# Patient Record
Sex: FEMALE | Race: WHITE | NOT HISPANIC OR LATINO
[De-identification: names, ages, dates, MRNs, and addresses within clinical notes are randomized per-mention and may not be internally consistent; named-entity substitution may affect disease eponyms.]

---

## 2017-11-26 ENCOUNTER — HEALTH MAINTENANCE LETTER (OUTPATIENT)
Age: 32
End: 2017-11-26

## 2020-03-02 ENCOUNTER — HEALTH MAINTENANCE LETTER (OUTPATIENT)
Age: 35
End: 2020-03-02

## 2020-12-14 ENCOUNTER — HEALTH MAINTENANCE LETTER (OUTPATIENT)
Age: 35
End: 2020-12-14

## 2021-04-18 ENCOUNTER — HEALTH MAINTENANCE LETTER (OUTPATIENT)
Age: 36
End: 2021-04-18

## 2021-10-02 ENCOUNTER — HEALTH MAINTENANCE LETTER (OUTPATIENT)
Age: 36
End: 2021-10-02

## 2022-05-14 ENCOUNTER — HEALTH MAINTENANCE LETTER (OUTPATIENT)
Age: 37
End: 2022-05-14

## 2022-09-04 ENCOUNTER — HEALTH MAINTENANCE LETTER (OUTPATIENT)
Age: 37
End: 2022-09-04

## 2023-02-03 ENCOUNTER — HOSPITAL ENCOUNTER (EMERGENCY)
Facility: CLINIC | Age: 38
Discharge: HOME | End: 2023-02-04
Attending: EMERGENCY MEDICINE | Admitting: EMERGENCY MEDICINE
Payer: COMMERCIAL

## 2023-02-03 VITALS
OXYGEN SATURATION: 97 % | SYSTOLIC BLOOD PRESSURE: 133 MMHG | BODY MASS INDEX: 29.35 KG/M2 | WEIGHT: 205 LBS | HEIGHT: 70 IN | HEART RATE: 91 BPM | DIASTOLIC BLOOD PRESSURE: 62 MMHG | RESPIRATION RATE: 16 BRPM | TEMPERATURE: 98 F

## 2023-02-03 DIAGNOSIS — R11.2 NAUSEA VOMITING AND DIARRHEA: ICD-10-CM

## 2023-02-03 DIAGNOSIS — R19.7 NAUSEA VOMITING AND DIARRHEA: ICD-10-CM

## 2023-02-03 LAB
ANION GAP SERPL CALCULATED.3IONS-SCNC: 13 MMOL/L (ref 7–15)
BUN SERPL-MCNC: 24.4 MG/DL (ref 6–20)
CALCIUM SERPL-MCNC: 9.7 MG/DL (ref 8.6–10)
CHLORIDE SERPL-SCNC: 107 MMOL/L (ref 98–107)
CREAT SERPL-MCNC: 0.87 MG/DL (ref 0.51–0.95)
DEPRECATED HCO3 PLAS-SCNC: 21 MMOL/L (ref 22–29)
GFR SERPL CREATININE-BSD FRML MDRD: 86 ML/MIN/1.73M2
GLUCOSE SERPL-MCNC: 133 MG/DL (ref 70–99)
HOLD SPECIMEN: NORMAL
HOLD SPECIMEN: NORMAL
POTASSIUM SERPL-SCNC: 4.2 MMOL/L (ref 3.4–5.3)
SODIUM SERPL-SCNC: 141 MMOL/L (ref 136–145)

## 2023-02-03 PROCEDURE — 99284 EMERGENCY DEPT VISIT MOD MDM: CPT | Mod: 25

## 2023-02-03 PROCEDURE — 36415 COLL VENOUS BLD VENIPUNCTURE: CPT | Performed by: EMERGENCY MEDICINE

## 2023-02-03 PROCEDURE — 96374 THER/PROPH/DIAG INJ IV PUSH: CPT

## 2023-02-03 PROCEDURE — 80048 BASIC METABOLIC PNL TOTAL CA: CPT | Performed by: EMERGENCY MEDICINE

## 2023-02-03 PROCEDURE — 258N000003 HC RX IP 258 OP 636: Performed by: EMERGENCY MEDICINE

## 2023-02-03 PROCEDURE — 96361 HYDRATE IV INFUSION ADD-ON: CPT

## 2023-02-03 PROCEDURE — 250N000011 HC RX IP 250 OP 636: Performed by: EMERGENCY MEDICINE

## 2023-02-03 RX ORDER — ONDANSETRON 4 MG/1
4 TABLET, ORALLY DISINTEGRATING ORAL EVERY 6 HOURS PRN
Qty: 10 TABLET | Refills: 0 | Status: SHIPPED | OUTPATIENT
Start: 2023-02-03 | End: 2023-02-06

## 2023-02-03 RX ORDER — ONDANSETRON 2 MG/ML
4 INJECTION INTRAMUSCULAR; INTRAVENOUS ONCE
Status: COMPLETED | OUTPATIENT
Start: 2023-02-03 | End: 2023-02-03

## 2023-02-03 RX ADMIN — SODIUM CHLORIDE 1000 ML: 9 INJECTION, SOLUTION INTRAVENOUS at 09:01

## 2023-02-03 RX ADMIN — ONDANSETRON 4 MG: 2 INJECTION INTRAMUSCULAR; INTRAVENOUS at 09:01

## 2023-02-03 ASSESSMENT — ACTIVITIES OF DAILY LIVING (ADL)
ADLS_ACUITY_SCORE: 33

## 2023-02-03 ASSESSMENT — ENCOUNTER SYMPTOMS
VOMITING: 1
BLOOD IN STOOL: 0
DIARRHEA: 1
HEADACHES: 1
NAUSEA: 1
DIZZINESS: 1

## 2023-02-03 NOTE — ED TRIAGE NOTES
Patient N/V/D since 1130pm last night. Daughter was recently ill with same. Patient reports N/V/D and HA. Patient reports inability to keep water or anything else down.  Concern for dehydration.     Triage Assessment     Row Name 02/03/23 0889       Triage Assessment (Adult)    Airway WDL WDL       Respiratory WDL    Respiratory WDL WDL       Skin Circulation/Temperature WDL    Skin Circulation/Temperature WDL WDL       Peripheral/Neurovascular WDL    Peripheral Neurovascular WDL WDL       Cognitive/Neuro/Behavioral WDL    Cognitive/Neuro/Behavioral WDL WDL

## 2023-02-03 NOTE — ED PROVIDER NOTES
"  History     Chief Complaint:  Dizziness and Nausea, Vomiting, & Diarrhea     The history is provided by the patient.      Naty Paz is a 37 year old female who presents with diarrhea, nausea, vomiting, and dizziness since last night. She had a headache a few days prior to her presentation today. She reports episodes of emesis and diarrhea about 12 times. She denies blood in emesis or stool. Her daughter and  were recently sick with similar symptoms.  Reports no concern for pregnancy. An at home COVID test was negative.     Independent Historian:   None - Patient Only    Review of External Notes:   None     ROS:  Review of Systems   Gastrointestinal: Positive for diarrhea, nausea and vomiting. Negative for blood in stool.   Neurological: Positive for dizziness and headaches.   All other systems reviewed and are negative.    Allergies:  Fish      Medications:    There are no current prescribed medications.     Past Medical History:    Anxiety   Depression    Social History:  She presents to the ED alone.     Physical Exam     Patient Vitals for the past 24 hrs:   BP Temp Temp src Pulse Resp SpO2 Height Weight   02/03/23 0843 133/62 98  F (36.7  C) Oral 91 16 97 % 1.778 m (5' 10\") 93 kg (205 lb)        Physical Exam  General: Alert and cooperative with exam. Patient in mild distress. Normal mentation.  Head:  Scalp is NC/AT  Eyes:  No scleral icterus, PERRL  ENT:  The external nose and ears are normal.   Neck:  Normal range of motion without rigidity.  CV:  Regular rate and rhythm    No pathologic murmur    Resp:  Breath sounds are clear bilaterally    Non-labored, no retractions or accessory muscle use  GI:  Abdomen is soft, no distension, no tenderness. No peritoneal signs  MS:  No lower extremity edema   Skin:  Warm and dry, No rash or lesions noted.  Neuro: Oriented x 3. No gross motor deficits.    Emergency Department Course     Laboratory:  Labs Ordered and Resulted from Time of ED Arrival to Time " of ED Departure   BASIC METABOLIC PANEL - Abnormal       Result Value    Sodium 141      Potassium 4.2      Chloride 107      Carbon Dioxide (CO2) 21 (*)     Anion Gap 13      Urea Nitrogen 24.4 (*)     Creatinine 0.87      Calcium 9.7      Glucose 133 (*)     GFR Estimate 86        Emergency Department Course & Assessments:    Interventions:  0901 Zofran 4 mg IV  0901 NS 1000 mL IV     Social Determinants of Health affecting care:   None    Assessments:  0850 I obtained history and examined the patient as noted above.   1045 I rechecked the patient and explained findings.     Disposition:  The patient was discharged to home.     Impression & Plan      Medical Decision Making:  Naty Paz is a 37 year old female presented to the ED with a complaint of vomiting and diarrhea. She has been otherwise well during their ED stay. IV Zofran was given. There has been no further vomiting while in the ED. The patient appears non-toxic. Abdomen is soft and non-tender.  Likely viral gastroenteritis.  At this time I believe she can be discharged and should follow up with the primary care provider in the outpatient setting. I have encouraged continued oral hydration at home. Anticipatory guidance given prior to discharge.  Tolerating oral intake prior to discharge.    Diagnosis:    ICD-10-CM    1. Nausea vomiting and diarrhea  R11.2     R19.7            Discharge Medications:  New Prescriptions    ONDANSETRON (ZOFRAN ODT) 4 MG ODT TAB    Take 1 tablet (4 mg) by mouth every 6 hours as needed for nausea      Scribe Disclosure:   LUISA DOSS, am serving as a scribe; to document services personally performed by Jeff Walters DO based on data collection and the provider's statements to me.     2/3/2023   Jeff Walters DO O'Neill, Christopher Warren, DO  02/03/23 1551

## 2023-02-04 ASSESSMENT — ACTIVITIES OF DAILY LIVING (ADL)
ADLS_ACUITY_SCORE: 33

## 2023-06-03 ENCOUNTER — HEALTH MAINTENANCE LETTER (OUTPATIENT)
Age: 38
End: 2023-06-03

## 2024-02-17 ENCOUNTER — HEALTH MAINTENANCE LETTER (OUTPATIENT)
Age: 39
End: 2024-02-17

## 2024-07-06 ENCOUNTER — HEALTH MAINTENANCE LETTER (OUTPATIENT)
Age: 39
End: 2024-07-06

## 2025-04-17 ENCOUNTER — HOSPITAL ENCOUNTER (EMERGENCY)
Facility: CLINIC | Age: 40
Discharge: HOME | End: 2025-04-17
Attending: EMERGENCY MEDICINE
Payer: COMMERCIAL

## 2025-04-17 VITALS
RESPIRATION RATE: 18 BRPM | SYSTOLIC BLOOD PRESSURE: 161 MMHG | WEIGHT: 204 LBS | BODY MASS INDEX: 29.27 KG/M2 | HEART RATE: 109 BPM | OXYGEN SATURATION: 97 % | DIASTOLIC BLOOD PRESSURE: 83 MMHG | TEMPERATURE: 98.5 F

## 2025-04-17 DIAGNOSIS — K59.00 CONSTIPATION, UNSPECIFIED CONSTIPATION TYPE: ICD-10-CM

## 2025-04-17 DIAGNOSIS — R10.30 LOWER ABDOMINAL PAIN: ICD-10-CM

## 2025-04-17 DIAGNOSIS — F11.90 OPIOID USE: ICD-10-CM

## 2025-04-17 PROCEDURE — 99284 EMERGENCY DEPT VISIT MOD MDM: CPT | Performed by: EMERGENCY MEDICINE

## 2025-04-17 PROCEDURE — 96372 THER/PROPH/DIAG INJ SC/IM: CPT | Performed by: EMERGENCY MEDICINE

## 2025-04-17 PROCEDURE — 250N000012 HC RX MED GY IP 250 OP 636 PS 637: Performed by: EMERGENCY MEDICINE

## 2025-04-17 RX ORDER — MAGNESIUM CARB/ALUMINUM HYDROX 105-160MG
296 TABLET,CHEWABLE ORAL ONCE
Qty: 296 ML | Refills: 0 | Status: SHIPPED | OUTPATIENT
Start: 2025-04-17 | End: 2025-04-17

## 2025-04-17 RX ADMIN — METHYLNALTREXONE BROMIDE 12 MG: 12 INJECTION, SOLUTION SUBCUTANEOUS at 08:30

## 2025-04-17 ASSESSMENT — COLUMBIA-SUICIDE SEVERITY RATING SCALE - C-SSRS
6. HAVE YOU EVER DONE ANYTHING, STARTED TO DO ANYTHING, OR PREPARED TO DO ANYTHING TO END YOUR LIFE?: NO
1. IN THE PAST MONTH, HAVE YOU WISHED YOU WERE DEAD OR WISHED YOU COULD GO TO SLEEP AND NOT WAKE UP?: NO
2. HAVE YOU ACTUALLY HAD ANY THOUGHTS OF KILLING YOURSELF IN THE PAST MONTH?: NO

## 2025-04-17 ASSESSMENT — ACTIVITIES OF DAILY LIVING (ADL)
ADLS_ACUITY_SCORE: 41

## 2025-04-17 NOTE — ED TRIAGE NOTES
Patient reports having oral surgery a week ago, was taking hydrocodone and has not had a bowel movement in 7 days. Has tried stool softeners and suppositories without success.       Triage Assessment (Adult)       Row Name 04/17/25 0650          Triage Assessment    Airway WDL WDL        Respiratory WDL    Respiratory WDL WDL        Cardiac WDL    Cardiac WDL WDL        Cognitive/Neuro/Behavioral WDL    Cognitive/Neuro/Behavioral WDL WDL

## 2025-04-17 NOTE — ED PROVIDER NOTES
Emergency Department Note      History of Present Illness     Chief Complaint:  Constipation    HPI   Naty Paz is a 39 year old female generally in good health without prior abdominal surgeries who presents emergency department for evaluation of constipation and some lower abdominal cramping.  1 week ago, she had outpatient wisdom tooth removal under general anesthesia and took some postoperative hydrocodone.  She feels that her mouth has recovered well, but she has not had a bowel movement since the surgery.  She is not normally constipated.  She tried a suppository with incomplete relief.  No prior enemas.  She is not on blood thinners.  Last menstrual period was several weeks ago and she does not suspect pregnancy.  She does not have urinary symptoms.  No vomiting.  She feels that she has stool building up in her lower intestine and rectum and she seeks relief from her constipation.    Review of External Notes: I personally reviewed prior records including the  and see that she filled [facility] [place] on April 9.    Past Medical History     Medical History and Problem List   Past Medical History:   Diagnosis Date    Anxiety     Depressive disorder      Medications   ALPRAZolam (XANAX) 1 MG tablet    Surgical History   No past surgical history on file.  Physical Exam     Patient Vitals for the past 24 hrs:   BP Temp Temp src Pulse Resp SpO2 Weight   04/17/25 0652 (!) 161/83 98.5  F (36.9  C) Temporal 109 18 97 % 92.5 kg (204 lb)     Physical Exam  General: woman recumbent in gurney in room 30  HENT: mucous membranes moist   CV: rate as above, no LE edema  Resp: normal effort, speaks in full phrases, no stridor, no cough observed  GI: Abdomen soft, extremely minimal lower abdominal tenderness without guarding, no focal tenderness, no distention  Rectal: Normal external exam of anus  Normal rectal tone  Palpable firm brown stool palpable and partially disimpacted by JACLYN Russ at time of ADDI  MSK: no bony  tenderness, no CVAT  Skin: appropriately warm and dry, no abdominal rash  Neuro: alert, clear speech, oriented   Psych: cooperative, pleasant    Diagnostics     ED Course      Medications Administered   Medications   methylnaltrexone (RELISTOR) 12 MG/0.6ML injection 12 mg (12 mg Subcutaneous $Given 4/17/25 0830)       ED Course and Discussion of Management   ED Course as of 04/17/25 1432   Thu Apr 17, 2025   0806 I called Pharmacy to discuss Relistor.   0922 I rechecked patient, very little bowel movement despite Relistor, plan for enema, I spoke with nurse to coordinate plan.   1042 I rechecked patient, discussed plan, she requests discharge.       Additional Documentation  None    MIPS       None    Medical Decision Making / Diagnosis   Medical Decision Making:  This pleasant woman presents with some lower abdominal cramping and a sensation of rectal fullness and constipation after taking a very reasonable course of opioids for postoperative pain from her recent oral surgery.  With regard to her oral surgery, she has no new mouth concerns, no signs of acute postoperative oral process.  Both the patient and I highly suspect that her abdominal symptoms are due to constipation that is likely multifactorial including recent general anesthesia as well as opioids.  She has no peritonitis.  I discussed with her that I cannot definitively diagnose this without other test to seek alternate causes, though suspicion is so low that patient and I agreed to defer workup such as blood test, urinalysis, or CT of the abdomen and pelvis, all of which was offered to her.  Instead we focused on symptomatic treatment.  She was given [employer] store with incomplete relief, followed by an enema that she did not tolerate well.  We discussed possible additional medications, and I prescribed magnesium citrate that she will plan to  today and try, if this is unsuccessful I think would be reasonable to try a bowel prep, this was also  prescribed though she should not take it on the same day as the magnesium citrate.  I also specifically discussed return precautions with her for severe pain, vomiting, high fevers, weakness, or any other concerns.  History not suggestive of pregnancy so testing not performed.  Patient discharged home after review of the above, noting that rectal exam performed by Petrona velásquez PA student with me today revealed nothing amenable to significant disimpaction.  Patient had questions answered and was eager for discharge rather than further monitoring her care here in the emergency department.    Disposition   Discharged    Diagnosis     ICD-10-CM    1. Lower abdominal pain  R10.30       2. Constipation, unspecified constipation type  K59.00       3. Opioid use  F11.90     limited, recent use for post-op pain           Discharge Medications   Discharge Medication List as of 4/17/2025 10:45 AM        START taking these medications    Details   magnesium citrate 1.745 GM/30ML solution Take 296 mLs by mouth once for 1 dose., Disp-296 mL, R-0, E-Prescribe      polyethylene glycol (GOLYTELY) 236 g suspension Take 4,000 mLs by mouth once for 1 dose., Disp-4000 mL, R-0, E-PrescribeTake this a day after the magnesium citrate if still experiencing significant constipation.             4/17/2025   MD Joana Walker Jeffrey Alan, MD  04/17/25 2250

## 2025-07-13 ENCOUNTER — HEALTH MAINTENANCE LETTER (OUTPATIENT)
Age: 40
End: 2025-07-13